# Patient Record
Sex: MALE | Race: BLACK OR AFRICAN AMERICAN | NOT HISPANIC OR LATINO | URBAN - METROPOLITAN AREA
[De-identification: names, ages, dates, MRNs, and addresses within clinical notes are randomized per-mention and may not be internally consistent; named-entity substitution may affect disease eponyms.]

---

## 2020-06-03 ENCOUNTER — OFFICE VISIT (OUTPATIENT)
Dept: URBAN - METROPOLITAN AREA CLINIC 13 | Facility: CLINIC | Age: 45
End: 2020-06-03

## 2020-06-03 ENCOUNTER — OFFICE VISIT (OUTPATIENT)
Dept: URBAN - METROPOLITAN AREA CLINIC 113 | Facility: CLINIC | Age: 45
End: 2020-06-03

## 2020-06-04 ENCOUNTER — OFFICE VISIT (OUTPATIENT)
Dept: URBAN - METROPOLITAN AREA CLINIC 113 | Facility: CLINIC | Age: 45
End: 2020-06-04

## 2020-06-15 ENCOUNTER — OFFICE VISIT (OUTPATIENT)
Dept: URBAN - METROPOLITAN AREA CLINIC 113 | Facility: CLINIC | Age: 45
End: 2020-06-15

## 2020-07-25 ENCOUNTER — TELEPHONE ENCOUNTER (OUTPATIENT)
Dept: URBAN - METROPOLITAN AREA CLINIC 13 | Facility: CLINIC | Age: 45
End: 2020-07-25

## 2020-07-25 RX ORDER — POLYETHYLENE GLYCOL 3350, SODIUM CHLORIDE, SODIUM BICARBONATE AND POTASSIUM CHLORIDE WITH LEMON FLAVOR 420; 11.2; 5.72; 1.48 G/4L; G/4L; G/4L; G/4L
USE AS DIRECTED POWDER, FOR SOLUTION ORAL
Qty: 1 | Refills: 0 | OUTPATIENT
Start: 2019-01-10 | End: 2019-02-08

## 2020-07-26 ENCOUNTER — TELEPHONE ENCOUNTER (OUTPATIENT)
Dept: URBAN - METROPOLITAN AREA CLINIC 13 | Facility: CLINIC | Age: 45
End: 2020-07-26

## 2020-07-26 RX ORDER — ALLOPURINOL 300 MG/1
TABLET ORAL
Qty: 90 | Refills: 0 | Status: ACTIVE | COMMUNITY
Start: 2020-06-14

## 2020-07-26 RX ORDER — INDOMETHACIN 50 MG/1
TAKE ONE CAPSULE BY MOUTH EVERY 6 HOURS AS NEEDED WITH FOOD FOR GOUT CAPSULE ORAL
Qty: 40 | Refills: 0 | Status: ACTIVE | COMMUNITY
Start: 2018-06-18

## 2020-07-26 RX ORDER — METHYLPREDNISOLONE 4 MG/1
TABLET ORAL
Qty: 21 | Refills: 0 | Status: ACTIVE | COMMUNITY
Start: 2020-02-25

## 2020-07-26 RX ORDER — CHLORHEXIDINE GLUCONATE 4 %
TAKE 1 TABLET BY MOUTH EVERY DAY LIQUID (ML) TOPICAL
Qty: 30 | Refills: 0 | Status: ACTIVE | COMMUNITY
Start: 2020-02-24

## 2020-07-26 RX ORDER — INDOMETHACIN 50 MG/1
CAPSULE ORAL
Qty: 30 | Refills: 0 | Status: ACTIVE | COMMUNITY
Start: 2020-01-14

## 2020-07-26 RX ORDER — ROSUVASTATIN CALCIUM 40 MG/1
TAKE 1 TABLET DAILY TABLET, FILM COATED ORAL
Refills: 0 | Status: ACTIVE | COMMUNITY
Start: 2018-06-18

## 2020-07-26 RX ORDER — ALLOPURINOL 300 MG/1
TABLET ORAL
Qty: 90 | Refills: 0 | Status: ACTIVE | COMMUNITY
Start: 2020-03-21

## 2020-07-26 RX ORDER — ESOMEPRAZOLE MAGNESIUM 40 MG/1
CAPSULE, DELAYED RELEASE PELLETS ORAL
Qty: 90 | Refills: 0 | Status: ACTIVE | COMMUNITY
Start: 2019-05-29

## 2020-07-26 RX ORDER — AZITHROMYCIN DIHYDRATE 250 MG/1
TABLET, FILM COATED ORAL
Qty: 6 | Refills: 0 | Status: ACTIVE | COMMUNITY
Start: 2020-02-25

## 2020-07-26 RX ORDER — ESOMEPRAZOLE MAGNESIUM 40 MG/1
TAKE 1 CAPSULE BY MOUTH EVERY DAY CAPSULE, DELAYED RELEASE PELLETS ORAL
Qty: 30 | Refills: 0 | Status: ACTIVE | COMMUNITY
Start: 2019-04-30

## 2020-07-26 RX ORDER — DICLOFENAC SODIUM 10 MG/G
GEL TOPICAL
Qty: 400 | Refills: 0 | Status: ACTIVE | COMMUNITY
Start: 2019-04-30

## 2020-07-26 RX ORDER — ALBUTEROL SULFATE 90 UG/1
AEROSOL, METERED RESPIRATORY (INHALATION)
Qty: 8 | Refills: 0 | Status: ACTIVE | COMMUNITY
Start: 2020-02-25

## 2020-07-26 RX ORDER — INDOMETHACIN 50 MG/1
CAPSULE ORAL
Qty: 30 | Refills: 0 | Status: ACTIVE | COMMUNITY
Start: 2020-03-21

## 2020-07-26 RX ORDER — CHLORHEXIDINE GLUCONATE 4 %
TAKE 1 TABLET BY MOUTH EVERY DAY LIQUID (ML) TOPICAL
Qty: 90 | Refills: 0 | Status: ACTIVE | COMMUNITY
Start: 2020-03-19

## 2020-07-26 RX ORDER — AMLODIPINE BESYLATE 5 MG/1
TAKE 1 TABLET DAILY AS DIRECTED TABLET ORAL
Refills: 0 | Status: ACTIVE | COMMUNITY
Start: 2018-04-09

## 2020-07-26 RX ORDER — METHYLPREDNISOLONE 4 MG/1
TABLET ORAL
Qty: 21 | Refills: 0 | Status: ACTIVE | COMMUNITY
Start: 2019-04-30

## 2020-07-26 RX ORDER — INDOMETHACIN 50 MG/1
CAPSULE ORAL
Qty: 40 | Refills: 0 | Status: ACTIVE | COMMUNITY
Start: 2019-04-16

## 2020-07-26 RX ORDER — OMEGA-3-ACID ETHYL ESTERS 1 G/1
TAKE 2 CAPSULE DAILY CAPSULE, LIQUID FILLED ORAL
Refills: 0 | Status: ACTIVE | COMMUNITY
Start: 2018-04-14

## 2020-07-26 RX ORDER — FERROUS SULFATE 325(65) MG
TAKE 1 TABLET TWICE DAILY TABLET ORAL
Qty: 180 | Refills: 3 | Status: ACTIVE | COMMUNITY
Start: 2020-05-14

## 2020-08-31 ENCOUNTER — TELEPHONE ENCOUNTER (OUTPATIENT)
Dept: URBAN - METROPOLITAN AREA CLINIC 113 | Facility: CLINIC | Age: 45
End: 2020-08-31

## 2020-09-02 ENCOUNTER — TELEPHONE ENCOUNTER (OUTPATIENT)
Dept: URBAN - METROPOLITAN AREA CLINIC 113 | Facility: CLINIC | Age: 45
End: 2020-09-02

## 2020-09-15 ENCOUNTER — OFFICE VISIT (OUTPATIENT)
Dept: URBAN - METROPOLITAN AREA CLINIC 113 | Facility: CLINIC | Age: 45
End: 2020-09-15
Payer: COMMERCIAL

## 2020-09-15 VITALS
TEMPERATURE: 98.2 F | DIASTOLIC BLOOD PRESSURE: 87 MMHG | RESPIRATION RATE: 18 BRPM | BODY MASS INDEX: 33.5 KG/M2 | WEIGHT: 234 LBS | HEART RATE: 91 BPM | HEIGHT: 70 IN | SYSTOLIC BLOOD PRESSURE: 140 MMHG

## 2020-09-15 DIAGNOSIS — R79.89 ELEVATED LFTS: ICD-10-CM

## 2020-09-15 DIAGNOSIS — D50.9 IRON DEFICIENCY ANEMIA: ICD-10-CM

## 2020-09-15 PROCEDURE — 99214 OFFICE O/P EST MOD 30 MIN: CPT | Performed by: INTERNAL MEDICINE

## 2020-09-15 PROCEDURE — G9902 PT SCRN TBCO AND ID AS USER: HCPCS | Performed by: INTERNAL MEDICINE

## 2020-09-15 PROCEDURE — G8419 CALC BMI OUT NRM PARAM NOF/U: HCPCS | Performed by: INTERNAL MEDICINE

## 2020-09-15 PROCEDURE — G8427 DOCREV CUR MEDS BY ELIG CLIN: HCPCS | Performed by: INTERNAL MEDICINE

## 2020-09-15 RX ORDER — INDOMETHACIN 50 MG/1
TAKE ONE CAPSULE BY MOUTH EVERY 6 HOURS AS NEEDED WITH FOOD FOR GOUT CAPSULE ORAL
Qty: 40 | Refills: 0 | Status: DISCONTINUED | COMMUNITY
Start: 2018-06-18

## 2020-09-15 RX ORDER — DICLOFENAC SODIUM 10 MG/G
GEL TOPICAL
Qty: 400 | Refills: 0 | Status: DISCONTINUED | COMMUNITY
Start: 2019-04-30

## 2020-09-15 RX ORDER — ALBUTEROL SULFATE 90 UG/1
AEROSOL, METERED RESPIRATORY (INHALATION)
Qty: 8 | Refills: 0 | Status: ACTIVE | COMMUNITY
Start: 2020-02-25

## 2020-09-15 RX ORDER — OMEGA-3-ACID ETHYL ESTERS 1 G/1
TAKE 2 CAPSULE DAILY CAPSULE, LIQUID FILLED ORAL
Refills: 0 | Status: DISCONTINUED | COMMUNITY
Start: 2018-04-14

## 2020-09-15 RX ORDER — METHYLPREDNISOLONE 4 MG/1
TABLET ORAL
Qty: 21 | Refills: 0 | Status: DISCONTINUED | COMMUNITY
Start: 2019-04-30

## 2020-09-15 RX ORDER — AZITHROMYCIN DIHYDRATE 250 MG/1
TABLET, FILM COATED ORAL
Qty: 6 | Refills: 0 | Status: DISCONTINUED | COMMUNITY
Start: 2020-02-25

## 2020-09-15 RX ORDER — ESOMEPRAZOLE MAGNESIUM 40 MG/1
TAKE 1 CAPSULE BY MOUTH EVERY DAY CAPSULE, DELAYED RELEASE PELLETS ORAL
Qty: 30 | Refills: 0 | Status: DISCONTINUED | COMMUNITY
Start: 2019-04-30

## 2020-09-15 RX ORDER — ROSUVASTATIN CALCIUM 40 MG/1
TAKE 1 TABLET DAILY TABLET, FILM COATED ORAL
Refills: 0 | Status: ACTIVE | COMMUNITY
Start: 2018-06-18

## 2020-09-15 RX ORDER — ICOSAPENT ETHYL 1000 MG/1
2 CAPSULES WITH MEALS CAPSULE ORAL TWICE A DAY
Status: ACTIVE | COMMUNITY

## 2020-09-15 RX ORDER — ALLOPURINOL 300 MG/1
1 TABLET TABLET ORAL ONCE A DAY
Refills: 0 | Status: ACTIVE | COMMUNITY
Start: 2020-03-21

## 2020-09-15 RX ORDER — FERROUS SULFATE 325(65) MG
TAKE 1 TABLET TWICE DAILY TABLET ORAL
Qty: 180 | Refills: 3 | Status: ACTIVE | COMMUNITY
Start: 2020-05-14

## 2020-09-15 RX ORDER — CHLORHEXIDINE GLUCONATE 4 %
TAKE 1 TABLET BY MOUTH EVERY DAY LIQUID (ML) TOPICAL
Qty: 30 | Refills: 0 | Status: DISCONTINUED | COMMUNITY
Start: 2020-02-24

## 2020-09-15 RX ORDER — AMLODIPINE BESYLATE 5 MG/1
TAKE 1 TABLET DAILY AS DIRECTED TABLET ORAL
Refills: 0 | Status: ACTIVE | COMMUNITY
Start: 2018-04-09

## 2020-09-15 NOTE — HPI-TODAY'S VISIT:
Patient returns today in follow-up.  He has history of iron deficiency anemia as well as elevated LFTs.  His most recent labs demonstrate a ferritin which is normalized.  He has normal hemoglobin.  His LFTs remain elevated.  He has a history of alcohol consumption.  He tells me that he is cut down on drinking with no more liquor but does drink 4 or 5 beers a day.  He denies any jaundice or dark urine.  No blood in the stool.  He is eating and drinking well otherwise.  He remains physically active.

## 2020-12-18 ENCOUNTER — OFFICE VISIT (OUTPATIENT)
Dept: URBAN - METROPOLITAN AREA CLINIC 113 | Facility: CLINIC | Age: 45
End: 2020-12-18
Payer: COMMERCIAL

## 2020-12-18 VITALS
WEIGHT: 246 LBS | HEART RATE: 81 BPM | SYSTOLIC BLOOD PRESSURE: 186 MMHG | RESPIRATION RATE: 20 BRPM | TEMPERATURE: 98.9 F | DIASTOLIC BLOOD PRESSURE: 99 MMHG | HEIGHT: 70 IN | BODY MASS INDEX: 35.22 KG/M2

## 2020-12-18 DIAGNOSIS — D50.8 OTHER IRON DEFICIENCY ANEMIAS: ICD-10-CM

## 2020-12-18 DIAGNOSIS — K64.8 INTERNAL HEMORRHOIDS: ICD-10-CM

## 2020-12-18 DIAGNOSIS — K62.5 RECTAL BLEED: ICD-10-CM

## 2020-12-18 DIAGNOSIS — R79.89 ELEVATED LFTS: ICD-10-CM

## 2020-12-18 PROCEDURE — G9903 PT SCRN TBCO ID AS NON USER: HCPCS | Performed by: INTERNAL MEDICINE

## 2020-12-18 PROCEDURE — G8417 CALC BMI ABV UP PARAM F/U: HCPCS | Performed by: INTERNAL MEDICINE

## 2020-12-18 PROCEDURE — G8483 FLU IMM NO ADMIN DOC REA: HCPCS | Performed by: INTERNAL MEDICINE

## 2020-12-18 PROCEDURE — G8427 DOCREV CUR MEDS BY ELIG CLIN: HCPCS | Performed by: INTERNAL MEDICINE

## 2020-12-18 PROCEDURE — 1036F TOBACCO NON-USER: CPT | Performed by: INTERNAL MEDICINE

## 2020-12-18 PROCEDURE — 99214 OFFICE O/P EST MOD 30 MIN: CPT | Performed by: INTERNAL MEDICINE

## 2020-12-18 RX ORDER — ALBUTEROL SULFATE 90 UG/1
AEROSOL, METERED RESPIRATORY (INHALATION)
Qty: 8 | Refills: 0 | Status: ACTIVE | COMMUNITY
Start: 2020-02-25

## 2020-12-18 RX ORDER — ICOSAPENT ETHYL 1000 MG/1
2 CAPSULES WITH MEALS CAPSULE ORAL TWICE A DAY
Status: ACTIVE | COMMUNITY

## 2020-12-18 RX ORDER — ALLOPURINOL 300 MG/1
1 TABLET TABLET ORAL ONCE A DAY
Refills: 0 | Status: ACTIVE | COMMUNITY
Start: 2020-03-21

## 2020-12-18 RX ORDER — ROSUVASTATIN CALCIUM 40 MG/1
TAKE 1 TABLET DAILY TABLET, FILM COATED ORAL
Refills: 0 | Status: DISCONTINUED | COMMUNITY
Start: 2018-06-18

## 2020-12-18 RX ORDER — FERROUS SULFATE 325(65) MG
TAKE 1 TABLET TWICE DAILY TABLET ORAL
Qty: 180 | Refills: 3 | Status: ACTIVE | COMMUNITY
Start: 2020-05-14

## 2020-12-18 RX ORDER — HYDROCORTISONE ACETATE 25 MG/1
1 SUPPOSITORY SUPPOSITORY RECTAL THREE TIMES A DAY
Qty: 10 | Refills: 1 | OUTPATIENT
Start: 2020-12-18 | End: 2021-01-07

## 2020-12-18 RX ORDER — AMLODIPINE BESYLATE 5 MG/1
TAKE 1 TABLET DAILY AS DIRECTED TABLET ORAL
Refills: 0 | Status: ACTIVE | COMMUNITY
Start: 2018-04-09

## 2020-12-18 NOTE — HPI-TODAY'S VISIT:
Patient returns today in follow-up.  Is not been seen in some time.  Has history of elevated LFTs as well as iron deficiency anemia.  He reports that he is overall feeling well.  No abdominal pain, nausea or vomiting.  No jaundice or dark urine.  He is having intermittent red blood per rectum.  His last colonoscopy was in 2019 when he had an extensive work-up for iron deficiency anemia.  He did have some moderate internal hemorrhoids that time.  He does report daily bowel movements.  Occasionally some straining.

## 2020-12-18 NOTE — PHYSICAL EXAM NECK/THYROID:
INR not at goal. Medications, chart, and patient findings reviewed. See calendar for adjustments to dose and follow up plan.      normal appearance , without tenderness upon palpation.

## 2021-01-29 ENCOUNTER — OFFICE VISIT (OUTPATIENT)
Dept: URBAN - METROPOLITAN AREA CLINIC 113 | Facility: CLINIC | Age: 46
End: 2021-01-29
Payer: COMMERCIAL

## 2021-01-29 ENCOUNTER — WEB ENCOUNTER (OUTPATIENT)
Dept: URBAN - METROPOLITAN AREA CLINIC 113 | Facility: CLINIC | Age: 46
End: 2021-01-29

## 2021-01-29 VITALS
SYSTOLIC BLOOD PRESSURE: 162 MMHG | HEART RATE: 85 BPM | WEIGHT: 246 LBS | BODY MASS INDEX: 35.22 KG/M2 | TEMPERATURE: 98.6 F | HEIGHT: 70 IN | DIASTOLIC BLOOD PRESSURE: 87 MMHG

## 2021-01-29 DIAGNOSIS — R79.89 ELEVATED LFTS: ICD-10-CM

## 2021-01-29 DIAGNOSIS — K64.8 INTERNAL HEMORRHOIDS: ICD-10-CM

## 2021-01-29 DIAGNOSIS — D50.9 IRON DEFICIENCY ANEMIA: ICD-10-CM

## 2021-01-29 PROCEDURE — 99213 OFFICE O/P EST LOW 20 MIN: CPT | Performed by: NURSE PRACTITIONER

## 2021-01-29 PROCEDURE — G8427 DOCREV CUR MEDS BY ELIG CLIN: HCPCS | Performed by: NURSE PRACTITIONER

## 2021-01-29 RX ORDER — AMLODIPINE BESYLATE 5 MG/1
TAKE 1 TABLET DAILY AS DIRECTED TABLET ORAL
Refills: 0 | Status: ACTIVE | COMMUNITY
Start: 2018-04-09

## 2021-01-29 RX ORDER — ICOSAPENT ETHYL 1000 MG/1
2 CAPSULES WITH MEALS CAPSULE ORAL TWICE A DAY
Status: ACTIVE | COMMUNITY

## 2021-01-29 RX ORDER — FERROUS SULFATE 325(65) MG
TAKE 1 TABLET TWICE DAILY TABLET ORAL
Qty: 180 | Refills: 3 | Status: ACTIVE | COMMUNITY
Start: 2020-05-14

## 2021-01-29 RX ORDER — ALBUTEROL SULFATE 90 UG/1
AEROSOL, METERED RESPIRATORY (INHALATION)
Qty: 8 | Refills: 0 | Status: ACTIVE | COMMUNITY
Start: 2020-02-25

## 2021-01-29 RX ORDER — ALLOPURINOL 300 MG/1
1 TABLET TABLET ORAL ONCE A DAY
Refills: 0 | Status: ACTIVE | COMMUNITY
Start: 2020-03-21

## 2021-01-29 NOTE — HPI-OTHER HISTORIES
45-year-old male with a history of elevated liver function tests and iron deficiency anemia, presenting for 6 week follow-up.  He was last seen in the office on 12/18/2020.  He had decreased alcohol consumption to weekends only with approximately 2 drinks per day at that time.  He did not ever complete hepatitis serologies as recommended at prior visits, so this was ordered.  He reported small volume red blood on the tissue associated with hemorrhoids, which she was treated with Anusol suppositories.  He was recommended CBC and CMP to assess stability of hemoglobin.    Labs 12/29/2020: BUN 14, creatinine 0.96, T bili 0.5, , AST 77, ALT 61, WBC 8.2, hemoglobin 11.8, hematocrit 36.1,  MCV 86, platelets 382, hepatitis C virus antibody negative, hepatitis B surface antibody nonreactive, hepatitis B surface antigen negative, hepatitis A total antibody negative  He continues to feel relatively well. He has red blood per rectum, small in volume, noted in the commode. This is not frequent. He has noted this occurs with eating heavier, gravy based foods, but not with fast food. No abdominal pain, nausea or vomiting. Weight is stable, appetite is great. No heartburn or dysphagia. He continues to drink ETOH, only on the weekends, and only about 4 or 6 drinks per weekend.

## 2021-04-23 ENCOUNTER — TELEPHONE ENCOUNTER (OUTPATIENT)
Dept: URBAN - METROPOLITAN AREA CLINIC 113 | Facility: CLINIC | Age: 46
End: 2021-04-23

## 2022-05-18 ENCOUNTER — LAB OUTSIDE AN ENCOUNTER (OUTPATIENT)
Dept: URBAN - METROPOLITAN AREA CLINIC 113 | Facility: CLINIC | Age: 47
End: 2022-05-18

## 2022-05-18 ENCOUNTER — OFFICE VISIT (OUTPATIENT)
Dept: URBAN - METROPOLITAN AREA CLINIC 113 | Facility: CLINIC | Age: 47
End: 2022-05-18
Payer: COMMERCIAL

## 2022-05-18 VITALS
DIASTOLIC BLOOD PRESSURE: 86 MMHG | HEIGHT: 70 IN | SYSTOLIC BLOOD PRESSURE: 167 MMHG | TEMPERATURE: 98.6 F | BODY MASS INDEX: 31.64 KG/M2 | HEART RATE: 80 BPM | WEIGHT: 221 LBS

## 2022-05-18 DIAGNOSIS — R19.5 HEME POSITIVE STOOL: ICD-10-CM

## 2022-05-18 DIAGNOSIS — D50.9 IRON DEFICIENCY ANEMIA, UNSPECIFIED IRON DEFICIENCY ANEMIA TYPE: ICD-10-CM

## 2022-05-18 DIAGNOSIS — R79.89 ELEVATED LFTS: ICD-10-CM

## 2022-05-18 DIAGNOSIS — R16.0 HEPATOMEGALY: ICD-10-CM

## 2022-05-18 PROBLEM — 87522002: Status: ACTIVE | Noted: 2022-05-18

## 2022-05-18 PROCEDURE — 99214 OFFICE O/P EST MOD 30 MIN: CPT | Performed by: INTERNAL MEDICINE

## 2022-05-18 RX ORDER — AMLODIPINE BESYLATE 5 MG/1
TAKE 1 TABLET DAILY AS DIRECTED TABLET ORAL
Refills: 0 | Status: ACTIVE | COMMUNITY
Start: 2018-04-09

## 2022-05-18 RX ORDER — ALBUTEROL SULFATE 90 UG/1
AEROSOL, METERED RESPIRATORY (INHALATION)
Qty: 8 | Refills: 0 | Status: ACTIVE | COMMUNITY
Start: 2020-02-25

## 2022-05-18 RX ORDER — ALLOPURINOL 300 MG/1
1 TABLET TABLET ORAL ONCE A DAY
Refills: 0 | Status: ACTIVE | COMMUNITY
Start: 2020-03-21

## 2022-05-18 RX ORDER — ICOSAPENT ETHYL 1000 MG/1
2 CAPSULES WITH MEALS CAPSULE ORAL TWICE A DAY
Status: ACTIVE | COMMUNITY

## 2022-05-18 RX ORDER — FERROUS SULFATE 325(65) MG
TAKE 1 TABLET TWICE DAILY TABLET ORAL
Qty: 180 | Refills: 3 | Status: ACTIVE | COMMUNITY
Start: 2020-05-14

## 2022-05-18 NOTE — HPI-TODAY'S VISIT:
Patient returns today in follow-up he reports that he is doing okay.  His only complaint really is some muscular pain in his back.  He denies any abdominal pain, nausea or vomiting.  Tells me that he is cut down on his alcohol but he continues to drink regularly.  No jaundice or dark urine.  No blood in the stool.  Most recent labs reviewed from March showed a normal creatinine and normal bilirubin.  His alkaline phosphatase was 147 with an AST of 101 and ALT 43.  CBC was normal.  He was fecal occult blood positive.  He tells me this was done during a rectal exam.  His last colonoscopy was in 2019 which showed hemorrhoids only.  He has had an extensive fairly recent previous work-up for anemia that failed to show any bleeding sources.

## 2022-05-18 NOTE — PHYSICAL EXAM GASTROINTESTINAL
Abdomen is soft, nontender, nondistended , no rebound or guarding. Apparent significant hepatomegaly appreciated

## 2022-05-19 LAB
A/G RATIO: 1.4
ALBUMIN: 4.5
ALKALINE PHOSPHATASE: 120
ALT (SGPT): 40
AST (SGOT): 81
BASO (ABSOLUTE): 0.1
BASOS: 1
BILIRUBIN, TOTAL: 0.7
BUN/CREATININE RATIO: 12
BUN: 9
CALCIUM: 9.4
CARBON DIOXIDE, TOTAL: 22
CHLORIDE: 101
CREATININE: 0.77
EGFR: 110
EOS (ABSOLUTE): 0.2
EOS: 4
GLOBULIN, TOTAL: 3.2
GLUCOSE: 69
HEMATOCRIT: 45
HEMATOLOGY COMMENTS:: (no result)
HEMOGLOBIN: 15.1
IMMATURE CELLS: (no result)
IMMATURE GRANS (ABS): 0
IMMATURE GRANULOCYTES: 0
INR: 1.1
LYMPHS (ABSOLUTE): 1.6
LYMPHS: 29
MCH: 31.2
MCHC: 33.6
MCV: 93
MONOCYTES(ABSOLUTE): 0.6
MONOCYTES: 10
NEUTROPHILS (ABSOLUTE): 3.1
NEUTROPHILS: 56
NRBC: (no result)
PLATELETS: 233
POTASSIUM: 3.9
PROTEIN, TOTAL: 7.7
PROTHROMBIN TIME: 11.5
RBC: 4.84
RDW: 14.4
SODIUM: 140
WBC: 5.5

## 2022-06-24 ENCOUNTER — TELEPHONE ENCOUNTER (OUTPATIENT)
Dept: URBAN - METROPOLITAN AREA CLINIC 96 | Facility: CLINIC | Age: 47
End: 2022-06-24

## 2022-06-24 ENCOUNTER — TELEPHONE ENCOUNTER (OUTPATIENT)
Dept: URBAN - METROPOLITAN AREA CLINIC 113 | Facility: CLINIC | Age: 47
End: 2022-06-24

## 2022-07-21 ENCOUNTER — LAB OUTSIDE AN ENCOUNTER (OUTPATIENT)
Dept: URBAN - METROPOLITAN AREA CLINIC 113 | Facility: CLINIC | Age: 47
End: 2022-07-21

## 2022-07-21 ENCOUNTER — OFFICE VISIT (OUTPATIENT)
Dept: URBAN - METROPOLITAN AREA CLINIC 113 | Facility: CLINIC | Age: 47
End: 2022-07-21
Payer: COMMERCIAL

## 2022-07-21 VITALS
DIASTOLIC BLOOD PRESSURE: 93 MMHG | TEMPERATURE: 98 F | BODY MASS INDEX: 32.93 KG/M2 | HEART RATE: 85 BPM | WEIGHT: 230 LBS | RESPIRATION RATE: 20 BRPM | SYSTOLIC BLOOD PRESSURE: 182 MMHG | HEIGHT: 70 IN

## 2022-07-21 DIAGNOSIS — M54.31 SCIATICA OF RIGHT SIDE: ICD-10-CM

## 2022-07-21 DIAGNOSIS — K64.8 INTERNAL HEMORRHOIDS: ICD-10-CM

## 2022-07-21 DIAGNOSIS — D50.9 IRON DEFICIENCY ANEMIA: ICD-10-CM

## 2022-07-21 DIAGNOSIS — R79.89 ELEVATED LFTS: ICD-10-CM

## 2022-07-21 PROBLEM — 707724006 ELEVATED LIVER ENZYMES LEVEL: Status: ACTIVE | Noted: 2020-09-15

## 2022-07-21 PROBLEM — 23056005: Status: ACTIVE | Noted: 2022-07-21

## 2022-07-21 PROBLEM — 87522002 IRON DEFICIENCY ANEMIA: Status: ACTIVE | Noted: 2020-09-15

## 2022-07-21 PROCEDURE — 99214 OFFICE O/P EST MOD 30 MIN: CPT | Performed by: INTERNAL MEDICINE

## 2022-07-21 RX ORDER — ICOSAPENT ETHYL 1000 MG/1
2 CAPSULES WITH MEALS CAPSULE ORAL TWICE A DAY
Status: ACTIVE | COMMUNITY

## 2022-07-21 RX ORDER — ALLOPURINOL 300 MG/1
1 TABLET TABLET ORAL ONCE A DAY
Refills: 0 | Status: ACTIVE | COMMUNITY
Start: 2020-03-21

## 2022-07-21 RX ORDER — AMLODIPINE BESYLATE 5 MG/1
TAKE 1 TABLET DAILY AS DIRECTED TABLET ORAL
Refills: 0 | Status: ACTIVE | COMMUNITY
Start: 2018-04-09

## 2022-07-21 RX ORDER — FERROUS SULFATE 325(65) MG
TAKE 1 TABLET TWICE DAILY TABLET ORAL
Qty: 180 | Refills: 3 | Status: ACTIVE | COMMUNITY
Start: 2020-05-14

## 2022-07-21 RX ORDER — ALBUTEROL SULFATE 90 UG/1
AEROSOL, METERED RESPIRATORY (INHALATION)
Qty: 8 | Refills: 0 | Status: ACTIVE | COMMUNITY
Start: 2020-02-25

## 2022-07-21 NOTE — HPI-TODAY'S VISIT:
Patient returns today in follow-up.  He reports she is doing well from a GI standpoint.  No abdominal pain.  No jaundice or dark urine.  He has continued to cut down on his alcohol usage.  Most recent labs were reviewed with him as below.  It does represent a downtrend.  He had a CT scan ordered but this was never performed as it was denied by the insurance company.  His main complaint today is actually back pain with radiation down his right leg.  This is been going on for greater than several weeks.  He has not seen his primary care physician or had any imaging yet.

## 2022-07-22 LAB
A/G RATIO: 1.1
ALBUMIN: 4
ALKALINE PHOSPHATASE: 143
ALT (SGPT): 44
AST (SGOT): 125
BILIRUBIN, TOTAL: 1.4
BUN/CREATININE RATIO: (no result)
BUN: 8
CALCIUM: 9.3
CARBON DIOXIDE, TOTAL: 24
CHLORIDE: 102
CREATININE: 0.69
EGFR: 114
GLOBULIN, TOTAL: 3.8
GLUCOSE: 86
POTASSIUM: 4.1
PROTEIN, TOTAL: 7.8
SODIUM: 141

## 2023-03-31 ENCOUNTER — OFFICE VISIT (OUTPATIENT)
Dept: URBAN - METROPOLITAN AREA CLINIC 113 | Facility: CLINIC | Age: 48
End: 2023-03-31
Payer: COMMERCIAL

## 2023-03-31 VITALS
DIASTOLIC BLOOD PRESSURE: 96 MMHG | WEIGHT: 230.4 LBS | SYSTOLIC BLOOD PRESSURE: 175 MMHG | RESPIRATION RATE: 20 BRPM | HEIGHT: 70 IN | BODY MASS INDEX: 32.99 KG/M2 | HEART RATE: 83 BPM | TEMPERATURE: 98.7 F

## 2023-03-31 DIAGNOSIS — D50.9 IRON DEFICIENCY ANEMIA, UNSPECIFIED IRON DEFICIENCY ANEMIA TYPE: ICD-10-CM

## 2023-03-31 DIAGNOSIS — R79.89 ELEVATED LFTS: ICD-10-CM

## 2023-03-31 DIAGNOSIS — R16.0 HEPATOMEGALY: ICD-10-CM

## 2023-03-31 PROCEDURE — 99214 OFFICE O/P EST MOD 30 MIN: CPT | Performed by: INTERNAL MEDICINE

## 2023-03-31 RX ORDER — PROPRANOLOL HYDROCHLORIDE 60 MG/1
1 TABLET TABLET ORAL
Status: ACTIVE | COMMUNITY

## 2023-03-31 RX ORDER — ALBUTEROL SULFATE 90 UG/1
AEROSOL, METERED RESPIRATORY (INHALATION)
Qty: 8 | Refills: 0 | Status: ACTIVE | COMMUNITY
Start: 2020-02-25

## 2023-03-31 RX ORDER — FERROUS SULFATE 325(65) MG
TAKE 1 TABLET TWICE DAILY TABLET ORAL
Qty: 180 | Refills: 3 | Status: ACTIVE | COMMUNITY
Start: 2020-05-14

## 2023-03-31 RX ORDER — AMLODIPINE BESYLATE 5 MG/1
TAKE 1 TABLET DAILY AS DIRECTED TABLET ORAL
Refills: 0 | Status: ACTIVE | COMMUNITY
Start: 2018-04-09

## 2023-03-31 RX ORDER — ICOSAPENT ETHYL 1000 MG/1
2 CAPSULES WITH MEALS CAPSULE ORAL TWICE A DAY
Status: ACTIVE | COMMUNITY

## 2023-03-31 RX ORDER — ALLOPURINOL 300 MG/1
1 TABLET TABLET ORAL ONCE A DAY
Refills: 0 | Status: ACTIVE | COMMUNITY
Start: 2020-03-21

## 2023-03-31 NOTE — HPI-TODAY'S VISIT:
Patient presents today in follow-up.  He has a history of elevated liver test thought related to alcohol.  He continues to drink alcohol Aung mostly on the weekends.  He reports drinking 18 beers on the weekend.  He denies any blood in his stool.  No abdominal pain nausea vomiting.  Tells me that he is planning on quitting drinking at this point.  No other new complaints or concerns.  Most recent labs reviewed from October showed a bilirubin 1.5 and alkaline phosphatase of 166  and ALT 54.  His CBC was normal.  No family history of liver disease.

## 2023-04-04 LAB
A/G RATIO: 1.1
ABSOLUTE BASOPHILS: 48
ABSOLUTE EOSINOPHILS: 78
ABSOLUTE LYMPHOCYTES: 1506
ABSOLUTE MONOCYTES: 570
ABSOLUTE NEUTROPHILS: 3798
ALBUMIN: 4.1
ALKALINE PHOSPHATASE: 98
ALT (SGPT): 32
AST (SGOT): 55
BASOPHILS: 0.8
BILIRUBIN, TOTAL: 1.3
BUN/CREATININE RATIO: (no result)
BUN: 14
CALCIUM: 9.9
CARBON DIOXIDE, TOTAL: 22
CHLORIDE: 104
CREATININE: 0.75
EGFR: 111
EOSINOPHILS: 1.3
GLOBULIN, TOTAL: 3.8
GLUCOSE: 89
HEMATOCRIT: 50.4
HEMOGLOBIN: 16.8
LYMPHOCYTES: 25.1
MCH: 31.6
MCHC: 33.3
MCV: 94.9
MONOCYTES: 9.5
MPV: 11.9
NEUTROPHILS: 63.3
PLATELET COUNT: 189
POTASSIUM: 4.2
PROTEIN, TOTAL: 7.9
RDW: 11.5
RED BLOOD CELL COUNT: 5.31
SODIUM: 137
WHITE BLOOD CELL COUNT: 6

## 2023-05-10 ENCOUNTER — OFFICE VISIT (OUTPATIENT)
Dept: URBAN - METROPOLITAN AREA CLINIC 113 | Facility: CLINIC | Age: 48
End: 2023-05-10

## 2023-05-12 ENCOUNTER — DASHBOARD ENCOUNTERS (OUTPATIENT)
Age: 48
End: 2023-05-12

## 2023-05-12 ENCOUNTER — OFFICE VISIT (OUTPATIENT)
Dept: URBAN - METROPOLITAN AREA CLINIC 113 | Facility: CLINIC | Age: 48
End: 2023-05-12
Payer: COMMERCIAL

## 2023-05-12 VITALS
HEART RATE: 68 BPM | RESPIRATION RATE: 16 BRPM | HEIGHT: 70 IN | DIASTOLIC BLOOD PRESSURE: 88 MMHG | TEMPERATURE: 97.6 F | BODY MASS INDEX: 32.35 KG/M2 | WEIGHT: 226 LBS | SYSTOLIC BLOOD PRESSURE: 157 MMHG

## 2023-05-12 DIAGNOSIS — D50.9 IRON DEFICIENCY ANEMIA, UNSPECIFIED IRON DEFICIENCY ANEMIA TYPE: ICD-10-CM

## 2023-05-12 DIAGNOSIS — R79.89 ELEVATED LFTS: ICD-10-CM

## 2023-05-12 DIAGNOSIS — R16.0 HEPATOMEGALY: ICD-10-CM

## 2023-05-12 PROCEDURE — 99213 OFFICE O/P EST LOW 20 MIN: CPT | Performed by: NURSE PRACTITIONER

## 2023-05-12 RX ORDER — ALBUTEROL SULFATE 90 UG/1
AEROSOL, METERED RESPIRATORY (INHALATION)
Qty: 8 | Refills: 0 | Status: ACTIVE | COMMUNITY
Start: 2020-02-25

## 2023-05-12 RX ORDER — ALLOPURINOL 300 MG/1
1 TABLET TABLET ORAL ONCE A DAY
Refills: 0 | Status: ACTIVE | COMMUNITY
Start: 2020-03-21

## 2023-05-12 RX ORDER — FERROUS SULFATE 325(65) MG
TAKE 1 TABLET TWICE DAILY TABLET ORAL
Qty: 180 | Refills: 3 | Status: ACTIVE | COMMUNITY
Start: 2020-05-14

## 2023-05-12 RX ORDER — PROPRANOLOL HYDROCHLORIDE 60 MG/1
1 TABLET TABLET ORAL
Status: ACTIVE | COMMUNITY

## 2023-05-12 RX ORDER — AMLODIPINE BESYLATE 5 MG/1
TAKE 1 TABLET DAILY AS DIRECTED TABLET ORAL
Refills: 0 | Status: ACTIVE | COMMUNITY
Start: 2018-04-09

## 2023-05-12 RX ORDER — ICOSAPENT ETHYL 1000 MG/1
2 CAPSULES WITH MEALS CAPSULE ORAL TWICE A DAY
Status: ACTIVE | COMMUNITY

## 2023-05-12 NOTE — HPI-TODAY'S VISIT:
This is a 48-year-old male with a history of elevated liver function tests, hepatomegaly and iron deficiency anemia, in the setting of chronic alcohol use.  He was seen in the office 3/31/2023.  Labs from October 2022 revealed bilirubin 1.5, alkaline phosphatase 166, , and ALT 54.  CBC was normal.  He reported alcohol use on the weekends with as many as 18 beers per weekend.  He denied any family history of liver disease.  He was recommended repeat blood work to assess liver function. Labs 3/31/2023 revealed glucose 89, BUN 14, creatinine 0.75, sodium 137, potassium 4.2, T. bili 1.3, ALP 98, AST 55, ALT 32, WBC 6, hemoglobin 16.8, MCV 94.9, and platelet count 189.  He has cut back on his alcohol use. He drinks about 12 drinks on the weekends between Friday and Sunday. He is asymptomatic from a GI standpoint.

## 2023-05-19 ENCOUNTER — OFFICE VISIT (OUTPATIENT)
Dept: URBAN - METROPOLITAN AREA CLINIC 113 | Facility: CLINIC | Age: 48
End: 2023-05-19

## 2023-08-31 ENCOUNTER — OFFICE VISIT (OUTPATIENT)
Dept: URBAN - METROPOLITAN AREA CLINIC 113 | Facility: CLINIC | Age: 48
End: 2023-08-31

## 2023-09-12 ENCOUNTER — OFFICE VISIT (OUTPATIENT)
Dept: URBAN - METROPOLITAN AREA CLINIC 113 | Facility: CLINIC | Age: 48
End: 2023-09-12

## 2025-05-21 ENCOUNTER — OFFICE VISIT (OUTPATIENT)
Dept: URBAN - METROPOLITAN AREA CLINIC 113 | Facility: CLINIC | Age: 50
End: 2025-05-21
Payer: COMMERCIAL

## 2025-05-21 DIAGNOSIS — F10.90 ALCOHOL USE: ICD-10-CM

## 2025-05-21 DIAGNOSIS — R79.89 ELEVATED LFTS: ICD-10-CM

## 2025-05-21 PROCEDURE — 99213 OFFICE O/P EST LOW 20 MIN: CPT | Performed by: NURSE PRACTITIONER

## 2025-05-21 RX ORDER — FERROUS SULFATE 325(65) MG
TAKE 1 TABLET TWICE DAILY TABLET ORAL
Qty: 180 | Refills: 3 | Status: ACTIVE | COMMUNITY
Start: 2020-05-14

## 2025-05-21 RX ORDER — PROPRANOLOL HYDROCHLORIDE 60 MG/1
1 TABLET TABLET ORAL
Status: ACTIVE | COMMUNITY

## 2025-05-21 RX ORDER — AMLODIPINE BESYLATE 5 MG/1
TAKE 1 TABLET DAILY AS DIRECTED TABLET ORAL
Refills: 0 | Status: ACTIVE | COMMUNITY
Start: 2018-04-09

## 2025-05-21 RX ORDER — ALBUTEROL SULFATE 90 UG/1
AEROSOL, METERED RESPIRATORY (INHALATION)
Qty: 8 | Refills: 0 | Status: ACTIVE | COMMUNITY
Start: 2020-02-25

## 2025-05-21 RX ORDER — ALLOPURINOL 300 MG/1
1 TABLET TABLET ORAL ONCE A DAY
Refills: 0 | Status: ACTIVE | COMMUNITY
Start: 2020-03-21

## 2025-05-21 RX ORDER — ICOSAPENT ETHYL 1000 MG/1
2 CAPSULES WITH MEALS CAPSULE ORAL TWICE A DAY
Status: ACTIVE | COMMUNITY

## 2025-05-21 NOTE — HPI-TODAY'S VISIT:
51 yo male with a history of elevated LFTs and hepatomegaly with iron deficiency anemia, presenting for long interval follow up.  He was last seen in the [facility] in May 2023, at which time he was noted to have improved LFTs with alcohol minimization.  He was referred back to our office by Dr. Romel Gutierrez for elevated LFTs. A copy of today's visit will be forwarded to the referring provider.  Hepatic function panel 3/31/25: Tbili 1.3, Dbili 0.6, , , .  He tells me that he and his wife were , which prompted him to resume drinking. He and his wife have gotten back together. His mother was diagnosed with cancer. He lost his job and got a new job. Shortly after starting the new job, he went to rehab for alcohol. This all took place in the last year. He has significantly decreased his alcohol intake, estimating drinking 2 beers per day. He previously would drink about a 6 pack per day. He tells me that he went to rehab for 30 days in August-September 2024.  There is no dysphagia, heartburn or abdominal pain. No nausea or vomiting. No melena. No red blood per rectum. There is jaundice, icterus or dark urine.  He had an ultrasound completed at [facility] on 4/24/25. Autoimmune, viral and hereditary screens for liver disease were negative in 2019.

## 2025-05-22 LAB
ALBUMIN/GLOBULIN RATIO: 1.1
ALBUMIN: 4
ALKALINE PHOSPHATASE: 114
ALT (SGPT): 39
AST (SGOT): 95
BILIRUBIN, DIRECT: 0.6
BILIRUBIN, INDIRECT: 1.1
BILIRUBIN, TOTAL: 1.7
GLOBULIN: 3.6
PROTEIN, TOTAL: 7.6

## 2025-07-01 ENCOUNTER — OFFICE VISIT (OUTPATIENT)
Dept: URBAN - METROPOLITAN AREA CLINIC 113 | Facility: CLINIC | Age: 50
End: 2025-07-01
Payer: COMMERCIAL

## 2025-07-01 ENCOUNTER — LAB OUTSIDE AN ENCOUNTER (OUTPATIENT)
Dept: URBAN - METROPOLITAN AREA CLINIC 113 | Facility: CLINIC | Age: 50
End: 2025-07-01

## 2025-07-01 DIAGNOSIS — F10.90 ALCOHOL USE: ICD-10-CM

## 2025-07-01 DIAGNOSIS — R16.0 HEPATOMEGALY: ICD-10-CM

## 2025-07-01 DIAGNOSIS — R04.2 HEMOPTYSIS: ICD-10-CM

## 2025-07-01 DIAGNOSIS — R79.89 ELEVATED LFTS: ICD-10-CM

## 2025-07-01 PROCEDURE — 99213 OFFICE O/P EST LOW 20 MIN: CPT | Performed by: NURSE PRACTITIONER

## 2025-07-01 RX ORDER — ALLOPURINOL 300 MG/1
1 TABLET TABLET ORAL ONCE A DAY
Refills: 0 | Status: ACTIVE | COMMUNITY
Start: 2020-03-21

## 2025-07-01 RX ORDER — PROPRANOLOL HYDROCHLORIDE 60 MG/1
1 TABLET TABLET ORAL
Status: ACTIVE | COMMUNITY

## 2025-07-01 RX ORDER — AMLODIPINE BESYLATE 5 MG/1
TAKE 1 TABLET DAILY AS DIRECTED TABLET ORAL
Refills: 0 | Status: ACTIVE | COMMUNITY
Start: 2018-04-09

## 2025-07-01 RX ORDER — ALBUTEROL SULFATE 90 UG/1
AEROSOL, METERED RESPIRATORY (INHALATION)
Qty: 8 | Refills: 0 | Status: ACTIVE | COMMUNITY
Start: 2020-02-25

## 2025-07-01 RX ORDER — ICOSAPENT ETHYL 1000 MG/1
2 CAPSULES WITH MEALS CAPSULE ORAL TWICE A DAY
Status: ACTIVE | COMMUNITY

## 2025-07-01 RX ORDER — FERROUS SULFATE 325(65) MG
TAKE 1 TABLET TWICE DAILY TABLET ORAL
Qty: 180 | Refills: 3 | Status: ACTIVE | COMMUNITY
Start: 2020-05-14

## 2025-07-01 NOTE — HPI-TODAY'S VISIT:
49 yo male with elevated LFTs and alcohol use, presenting for follow up.  He was seen in the office on 5/21/25 for elevated LFTs in the setting of alcohol use. Autoimmune, viral and hereditary screens in 2019 were negative. Labs 5/21/25 show an elevated Tbili 1.7, , AST 95, ALT 39. ALT, AST and ALP are improved from 4/2/25.  He tells me that he is doing well. He continues to drink alcohol, mostly on the weekeds, estimated to be 8 beers on the weekends. He denies any jaundice or icterus. No abdominal ascites or lower extremity edema. No dark urine currently, though on occasion he can have dark urine. There is no nausea or vomiting. He tells me that he sometimes spits up blood, characterized as red streaked sputum. There is no cough. There is no epistaxis. No oral mucosal injury. There is no heartburn, abdominal pain, nausea or vomiting. There is no melena. No red blood per rectum. He has bowel movements daily.

## 2025-08-25 ENCOUNTER — OFFICE VISIT (OUTPATIENT)
Dept: URBAN - METROPOLITAN AREA MEDICAL CENTER 2 | Facility: MEDICAL CENTER | Age: 50
End: 2025-08-25